# Patient Record
(demographics unavailable — no encounter records)

---

## 2025-02-06 NOTE — ASSESSMENT
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       - Genetics are pending (drawn at [place]). - The patient is seeking other care options and will inform the office of her final decision. - RTO in 2 weeks for tele-health visit.

## 2025-02-06 NOTE — HISTORY OF PRESENT ILLNESS
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    PMHx: none PSHx: s/p Right excisional biopsy for a papilloma on core bx.  Meds: none NKDA Family Hx:  paternal aunt 40 BCa.   paternal great aunt BCa 80s.   maternal aunts 2 - not affected.   GYN: , menarche age 12, LMP 3 weeks.   used Nexplanon intermittently.    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

## 2025-02-06 NOTE — DATA REVIEWED
[FreeTextEntry1] : BREAST PATH/RAD REVIEW.  [place]. 1/3/25 BL SC MG/US: birads 0. HD.  (Rec submitting priors if available)  - R UOQ focal asymmetry w/ associated distortion and calcs, there a clip in this region. (Rec Dx MG/US)  - R upper central grouped calcs. (Rec Dx MG)  - R inner central focal asymmetry.  (Rec Dx MG)    1/17/25 R Dx MG/BL US - R UOQ grouped calcs spanning 2.7 cm. [place] clip seen (no path reports available). (Rec Stereo bx) - R UOQ middle depth area of architectural distortion from post-surgical scarring.  - R LIQ persistent focal asymmetry.  - R scattered cysts.   1/23/25 Right stereo bx.  1. Right [UOQ] w/ CA++: DCIS (intermediate to high nuclear grade), solid type associated w/ micocalcs. LCIS, classic type. IDP.  ER 70-80%, AL 80%.   TopHat clip. Concordant.  2. Right [UOQ] w/o CA++: LCIS, classic type. Micocalcs associated w/ benign epithelium.  TopHat clip and residual calcs are amenable to pre-operative bracketing localization

## 2025-02-06 NOTE — HISTORY OF PRESENT ILLNESS
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    PMHx: none PSHx: s/p Right excisional biopsy for a papilloma on core bx.  Meds: none NKDA Family Hx:  paternal aunt 40 BCa.   paternal great aunt BCa 80s.   maternal aunts 2 - not affected.   GYN: , menarche age 12, LMP 3 weeks.   used Nexplanon intermittently.    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

## 2025-02-06 NOTE — ASSESSMENT
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       - Genetics are pending (drawn at [place]). - The patient is seeking other care options and will inform the office of her final decision. - RTO in 2 weeks for tele-health visit.

## 2025-02-06 NOTE — ASSESSMENT
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       - Genetics are pending (drawn at [place]). - The patient is seeking other care options and will inform the office of her final decision. - RTO in 2 weeks for tele-health visit.

## 2025-02-06 NOTE — HISTORY OF PRESENT ILLNESS
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    PMHx: none PSHx: s/p Right excisional biopsy for a papilloma on core bx.  Meds: none NKDA Family Hx:  paternal aunt 40 BCa.   paternal great aunt BCa 80s.   maternal aunts 2 - not affected.   GYN: , menarche age 12, LMP 3 weeks.   used Nexplanon intermittently.    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

## 2025-02-06 NOTE — DATA REVIEWED
[FreeTextEntry1] : BREAST PATH/RAD REVIEW.  [place]. 1/3/25 BL SC MG/US: birads 0. HD.  (Rec submitting priors if available)  - R UOQ focal asymmetry w/ associated distortion and calcs, there a clip in this region. (Rec Dx MG/US)  - R upper central grouped calcs. (Rec Dx MG)  - R inner central focal asymmetry.  (Rec Dx MG)    1/17/25 R Dx MG/BL US - R UOQ grouped calcs spanning 2.7 cm. [place] clip seen (no path reports available). (Rec Stereo bx) - R UOQ middle depth area of architectural distortion from post-surgical scarring.  - R LIQ persistent focal asymmetry.  - R scattered cysts.   1/23/25 Right stereo bx.  1. Right [UOQ] w/ CA++: DCIS (intermediate to high nuclear grade), solid type associated w/ micocalcs. LCIS, classic type. IDP.  ER 70-80%, NC 80%.   TopHat clip. Concordant.  2. Right [UOQ] w/o CA++: LCIS, classic type. Micocalcs associated w/ benign epithelium.  TopHat clip and residual calcs are amenable to pre-operative bracketing localization

## 2025-02-06 NOTE — DATA REVIEWED
[FreeTextEntry1] : BREAST PATH/RAD REVIEW.  [place]. 1/3/25 BL SC MG/US: birads 0. HD.  (Rec submitting priors if available)  - R UOQ focal asymmetry w/ associated distortion and calcs, there a clip in this region. (Rec Dx MG/US)  - R upper central grouped calcs. (Rec Dx MG)  - R inner central focal asymmetry.  (Rec Dx MG)    1/17/25 R Dx MG/BL US - R UOQ grouped calcs spanning 2.7 cm. [place] clip seen (no path reports available). (Rec Stereo bx) - R UOQ middle depth area of architectural distortion from post-surgical scarring.  - R LIQ persistent focal asymmetry.  - R scattered cysts.   1/23/25 Right stereo bx.  1. Right [UOQ] w/ CA++: DCIS (intermediate to high nuclear grade), solid type associated w/ micocalcs. LCIS, classic type. IDP.  ER 70-80%, ME 80%.   TopHat clip. Concordant.  2. Right [UOQ] w/o CA++: LCIS, classic type. Micocalcs associated w/ benign epithelium.  TopHat clip and residual calcs are amenable to pre-operative bracketing localization